# Patient Record
(demographics unavailable — no encounter records)

---

## 2025-01-09 NOTE — HISTORY OF PRESENT ILLNESS
[Never] : never [TextBox_4] : Ms. Bermeo is a 51 year-old female with a history of bronchiectasis, asthma, pseudomonas airway colonization, HLD, and DM2 (on Metformin and Ozempic) who presents for follow-up. She recently had worsening cough and sputum production with sinus congestion and green phlegm production in Dec 2024 s/p Z-pack and Medrol dose pack. Previously in October she also required Z-pack and Medrol dose pack. She is adherent with Trelegy Ellipta inhaler, Levalbuterol/3%NaCl nebs, Aerobika, montelukast, and intranasal steroid. She reports a history of asthma since her childhood. Reports chronic wheezing, chest tightness, chronic cough, and chronic nocturnal symptoms prior to starting Trelegy Ellipta inhaler. She last experienced hemoptysis due to bronchiectasis exacerbation in March 2024 requiring levofloxacin for 14 days. Prior exacerbation with hemoptysis more than 5 years ago. Sputum AFB negative in March 2024. She is prescribed airway clearance therapy with levalbuterol nebulizer every 8 hours + 3% sodium chloride nebulizer every 8 hours + Aerobika device after nebulizer therapy and throughout the day as tolerates. She reports using airway clearance therapy only twice daily and has difficulty tolerating levalbuterol due to development of tremors. She was previously started on tobramycin nebulizer 300 mg every 12 hours (28 days on, 28 days off); however, she developed joint pains and had to stop. Given associated asthma symptomatology (wheezing, chest tightness, cough, and nocturnal symptoms), she was also started on Trelegy Ellipta inhaler and montelukast with improvement. She also has some nasal congestion for which she takes fluticasone.   Sputum culture in March 2024 with Pseudomonas aeruginosa, intermediate to ciprofloxacin but sensitive to levofloxacin s/p levofloxacin 750 mg daily for 14 days. Repeat sputum culture in September 2024 with normal respiratory german. Sputum AFB in March/Sept 2024 negative. Sputum fungal culture in March 2024 negative.  LABS in March 2024 with borderline RF (15). CBC with microcytic anemia (HGB 11.3, MCV 74.7). No peripheral eosinophilia. IgE borderline elevated to 115. Workup otherwise unremarkable, including CMP (glucose elevated to 201), 25-OH Vit D (49.9), immunoglobulins, ANTHONY, CCP, CF expanded panel, aspergillus antibodies, A1AT, HIV, Quantiferon, and ACE. Iron/TIBC normal, %sat 12%, ferritin 41. A1C elevated to 8.1.  PFTs (March 2024) with normal spirometry, lung volumes, and diffusing capacity. No bronchodilator response.  CT Chest (March 2024) with patent central tracheobronchial tree. 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 Additional bronchiectasis in bilateral lower lobes (L>R). Mosaic attenuation. 4 mm nodular opacity in JORGE unchanged.

## 2025-01-09 NOTE — REVIEW OF SYSTEMS
[Nasal Congestion] : nasal congestion [Cough] : cough [Hemoptysis] : hemoptysis [Chest Tightness] : chest tightness [Sputum] : sputum [Wheezing] : wheezing [Negative] : Endocrine

## 2025-01-09 NOTE — ASSESSMENT
[FreeTextEntry1] : -  Ms. Bermeo is a 51 year-old female with a history of bronchiectasis, asthma, pseudomonas airway colonization, HLD, and DM2 (on Metformin and Ozempic) who presents for follow-up. She recently had worsening cough and sputum production with sinus congestion and green phlegm production in Dec 2024 s/p Z-pack and Medrol dose pack. Previously in October she also required Z-pack and Medrol dose pack. She is adherent with Trelegy Ellipta inhaler, Levalbuterol/3%NaCl nebs, Aerobika, montelukast, and intranasal steroid. She reports a history of asthma since her childhood. Reports chronic wheezing, chest tightness, chronic cough, and chronic nocturnal symptoms prior to starting Trelegy Ellipta inhaler. She last experienced hemoptysis due to bronchiectasis exacerbation in March 2024 requiring levofloxacin for 14 days. Prior exacerbation with hemoptysis more than 5 years ago. Sputum AFB negative in March 2024. She is prescribed airway clearance therapy with levalbuterol nebulizer every 8 hours + 3% sodium chloride nebulizer every 8 hours + Aerobika device after nebulizer therapy and throughout the day as tolerates. She reports using airway clearance therapy only twice daily and has difficulty tolerating levalbuterol due to development of tremors. She was previously started on tobramycin nebulizer 300 mg every 12 hours (28 days on, 28 days off); however, she developed joint pains and had to stop. Given associated asthma symptomatology (wheezing, chest tightness, cough, and nocturnal symptoms), she was also started on Trelegy Ellipta inhaler and montelukast with improvement. She also has some nasal congestion for which she takes fluticasone.  SPUTUM culture in March 2024 with Pseudomonas aeruginosa, intermediate to ciprofloxacin but sensitive to levofloxacin s/p levofloxacin 750 mg daily for 14 days. Repeat sputum culture in September 2024 with normal respiratory german. Sputum AFB in March/Sept 2024 negative. Sputum fungal culture in March 2024 negative.  LABS in March 2024 with borderline RF (15). CBC with microcytic anemia (HGB 11.3, MCV 74.7). No peripheral eosinophilia. IgE borderline elevated to 115. Workup otherwise unremarkable, including CMP (glucose elevated to 201), 25-OH Vit D (49.9), immunoglobulins, ANTHONY, CCP, CF expanded panel, aspergillus antibodies, A1AT, HIV, Quantiferon, and ACE. Iron/TIBC normal, %sat 12%, ferritin 41. A1C elevated to 8.1.  PFTs (March 2024) with normal spirometry, lung volumes, and diffusing capacity. No bronchodilator response.  CT Chest (March 2024) with patent central tracheobronchial tree. 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 Additional bronchiectasis in bilateral lower lobes (L>R). Mosaic attenuation. 4 mm nodular opacity in JORGE unchanged.    ASSESSMENT: Non-CF bronchiectasis Moderate persistent asthma History of Pseudomonas aeruginosa infection  PLAN: - Repeat sputum culture to evaluate for continued eradication of Pseudomonas aeruginosa - Check sputum AFB rule out non-tuberculous mycobacteria - Check sputum fungal culture - Continue airway clearance therapy with Levalbuterol nebulizer + 3% sodium 2-3x/day followed by Aerobika device - Can do 1.5 of the 3 mL of levalbuterol given reported tremors - Having difficulty tolerating Aerobika at setting of 3, can decrease to setting of 2 or 1 as tolerates - Recommend to use Aerobika device during the day as necessary for chest congestion - Hold off on tobramycin nebulizer therapy given previous myalgias and inability to tolerate - CT chest from March 2024 reviewed, not significantly changed compared to prior CT chest at [facility] in Sept 2020 - Continue Trelegy Ellipta inhaler 200 mcg 1 inhalation daily, rinse after use - Montelukast 10 mg at bedtime - Fluticasone nasal spray 2 sprays per nostril twice daily - Benzonatate 200 mg 2-3 times/day as necessary for cough or chest congestion - Up to date with influenza and Prevnar 20 vaccines (Sept 2024) - Follow-up in 3-4 months or sooner PRN

## 2025-01-09 NOTE — ASSESSMENT
[FreeTextEntry1] : -  Ms. Bermeo is a 51 year-old female with a history of bronchiectasis, asthma, pseudomonas airway colonization, HLD, and DM2 (on Metformin and Ozempic) who presents for follow-up. She recently had worsening cough and sputum production with sinus congestion and green phlegm production in Dec 2024 s/p Z-pack and Medrol dose pack. Previously in October she also required Z-pack and Medrol dose pack. She is adherent with Trelegy Ellipta inhaler, Levalbuterol/3%NaCl nebs, Aerobika, montelukast, and intranasal steroid. She reports a history of asthma since her childhood. Reports chronic wheezing, chest tightness, chronic cough, and chronic nocturnal symptoms prior to starting Trelegy Ellipta inhaler. She last experienced hemoptysis due to bronchiectasis exacerbation in March 2024 requiring levofloxacin for 14 days. Prior exacerbation with hemoptysis more than 5 years ago. Sputum AFB negative in March 2024. She is prescribed airway clearance therapy with levalbuterol nebulizer every 8 hours + 3% sodium chloride nebulizer every 8 hours + Aerobika device after nebulizer therapy and throughout the day as tolerates. She reports using airway clearance therapy only twice daily and has difficulty tolerating levalbuterol due to development of tremors. She was previously started on tobramycin nebulizer 300 mg every 12 hours (28 days on, 28 days off); however, she developed joint pains and had to stop. Given associated asthma symptomatology (wheezing, chest tightness, cough, and nocturnal symptoms), she was also started on Trelegy Ellipta inhaler and montelukast with improvement. She also has some nasal congestion for which she takes fluticasone.  SPUTUM culture in March 2024 with Pseudomonas aeruginosa, intermediate to ciprofloxacin but sensitive to levofloxacin s/p levofloxacin 750 mg daily for 14 days. Repeat sputum culture in September 2024 with normal respiratory german. Sputum AFB in March/Sept 2024 negative. Sputum fungal culture in March 2024 negative.  LABS in March 2024 with borderline RF (15). CBC with microcytic anemia (HGB 11.3, MCV 74.7). No peripheral eosinophilia. IgE borderline elevated to 115. Workup otherwise unremarkable, including CMP (glucose elevated to 201), 25-OH Vit D (49.9), immunoglobulins, ANTHONY, CCP, CF expanded panel, aspergillus antibodies, A1AT, HIV, Quantiferon, and ACE. Iron/TIBC normal, %sat 12%, ferritin 41. A1C elevated to 8.1.  PFTs (March 2024) with normal spirometry, lung volumes, and diffusing capacity. No bronchodilator response.  CT Chest (March 2024) with patent central tracheobronchial tree. 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 Additional bronchiectasis in bilateral lower lobes (L>R). Mosaic attenuation. 4 mm nodular opacity in JORGE unchanged.    ASSESSMENT: Non-CF bronchiectasis Moderate persistent asthma History of Pseudomonas aeruginosa infection  PLAN: - Repeat sputum culture to evaluate for continued eradication of Pseudomonas aeruginosa - Check sputum AFB rule out non-tuberculous mycobacteria - Check sputum fungal culture - Continue airway clearance therapy with Levalbuterol nebulizer + 3% sodium 2-3x/day followed by Aerobika device - Can do 1.5 of the 3 mL of levalbuterol given reported tremors - Having difficulty tolerating Aerobika at setting of 3, can decrease to setting of 2 or 1 as tolerates - Recommend to use Aerobika device during the day as necessary for chest congestion - Hold off on tobramycin nebulizer therapy given previous myalgias and inability to tolerate - CT chest from March 2024 reviewed, not significantly changed compared to prior CT chest at [facility] in Sept 2020 - Continue Trelegy Ellipta inhaler 200 mcg 1 inhalation daily, rinse after use - Montelukast 10 mg at bedtime - Fluticasone nasal spray 2 sprays per nostril twice daily - Benzonatate 200 mg 2-3 times/day as necessary for cough or chest congestion - Up to date with influenza and Prevnar 20 vaccines (Sept 2024) - Follow-up in 3-4 months or sooner PRN

## 2025-07-30 NOTE — ASSESSMENT
[FreeTextEntry1] : -  Ms. Bermeo is a 51 year-old female with a history of bronchiectasis, asthma, pseudomonas airway colonization, HLD, and DM2 (on Metformin and Ozempic) who presents for follow-up. States she has had a worsening cough with green sputum production for the last month. Denies any fevers or other evidence of systemic symptoms. Patient states she restarted nebulized Tobramycin BID 3 days ago, notes improvement in her symptoms since restarting. She recently had worsening cough and sputum production with sinus congestion and green phlegm production in March 2025 and was treated with Ciprofloxacin 14-day course also with exacerbation in Dec 2024 s/p Z-pack and Medrol dose pack and October 2024 she also required Z-pack and Medrol dose pack. She is adherent with Trelegy Ellipta inhaler, states she has been using albuterol rescue inhaler twice daily for symptom relief, Montelukast and fluticasone nasal spray daily. Complaint with Levalbuterol/3%NaCl nebs twice daily, has not been using Aerobika for the past few months. She reports a history of asthma since her childhood. Reports chronic wheezing, chest tightness, chronic cough, and chronic nocturnal symptoms prior to starting Trelegy Ellipta inhaler. She last experienced hemoptysis due to bronchiectasis exacerbation in March 2024 requiring levofloxacin for 14 days. Prior exacerbation with hemoptysis more than 5 years ago. Sputum AFB negative in January 2025. She is prescribed airway clearance therapy with levalbuterol nebulizer every 8 hours + 3% sodium chloride nebulizer every 8 hours + Aerobika device after nebulizer therapy and throughout the day as tolerates. She reports using airway clearance nebulizer therapy only twice daily and has not been compliant with Aerobika. She was previously started on tobramycin nebulizer 300 mg every 12 hours (28 days on, 28 days off); however, she developed joint pains and had to stop, recently restarted tobramycin nebulized 3 days ago. Given associated asthma symptomatology (wheezing, chest tightness, cough, and nocturnal symptoms), she was also started on Trelegy Ellipta inhaler and montelukast with improvement. She also has some nasal congestion for which she takes fluticasone.   CT CHEST (July 2025) with cystic bronchiectasis with complete collapse of the RML. Bilateral lower lobe cystic/cylindrical bronchiectasis with few foci of mucous plugging. No focal consolidation. NTM in the differential.  Sputum culture in March 2024 with Pseudomonas aeruginosa, intermediate to ciprofloxacin but sensitive to levofloxacin s/p levofloxacin 750 mg daily for 14 days. Repeat sputum culture in September 2024 with normal respiratory german. Sputum AFB in March/Sept 2024 negative. Sputum fungal culture in March 2024 negative.  LABS in March 2024 with borderline RF (15). CBC with microcytic anemia (HGB 11.3, MCV 74.7). No peripheral eosinophilia. IgE borderline elevated to 115. Workup otherwise unremarkable, including CMP (glucose elevated to 201), 25-OH Vit D (49.9), immunoglobulins, ANTHONY, CCP, CF expanded panel, aspergillus antibodies, A1AT, HIV, Quantiferon, and ACE. Iron/TIBC normal, %sat 12%, ferritin 41. A1C elevated to 8.1.  PFTs (March 2024) with normal spirometry, lung volumes, and diffusing capacity. No bronchodilator response.  CT Chest (March 2024) with patent central tracheobronchial tree. 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 Additional bronchiectasis in bilateral lower lobes (L>R). Mosaic attenuation. 4 mm nodular opacity in JORGE unchanged.  ASSESSMENT: Non-CF bronchiectasis Moderate persistent asthma History of Pseudomonas aeruginosa infection  PLAN: - Repeat sputum culture to evaluate for persistent colonization of Pseudomonas aeruginosa given her purulent sputum production - Check sputum AFB rule out non-tuberculous mycobacteria -Will upgrade to 7% Sodium chloride nebulized for airway clearance - Continue airway clearance therapy with Levalbuterol nebulizer + 7% sodium 2-3x/day, advised that she can use with Aerobika device to decrease the time spent doing ACT - Recommend using Aerobika device during the day as necessary for chest congestion - Will continue with Tobramycin (28 days on 28 days off) -Will hold off on additional antibiotics pending sputum culture results. Discussed potential of starting Azithromycin MWF if continued colonization - CT chest from July 2025 at [facility] reviewed, not significantly changed compared to prior CT chest in March 2024 - Continue Trelegy Ellipta inhaler 200 mcg 1 inhalation daily, rinse after use - Montelukast 10 mg at bedtime - Fluticasone nasal spray 2 sprays per nostril twice daily - Benzonatate 200 mg 2-3 times/day as necessary for cough or chest congestion - Up to date with influenza and Prevnar 20 vaccines (Sept 2024) -Will repeat PFTs and FeNO at follow-up visit - Follow-up in 3-4 months or sooner PRN

## 2025-07-30 NOTE — ASSESSMENT
[FreeTextEntry1] : -  Ms. Bermeo is a 51 year-old female with a history of bronchiectasis, asthma, pseudomonas airway colonization, HLD, and DM2 (on Metformin and Ozempic) who presents for follow-up. States she has had a worsening cough with green sputum production for the last month. Denies any fevers or other evidence of systemic symptoms. Patient states she restarted nebulized Tobramycin BID 3 days ago, notes improvement in her symptoms since restarting. She recently had worsening cough and sputum production with sinus congestion and green phlegm production in March 2025 and was treated with Ciprofloxacin 14-day course also with exacerbation in Dec 2024 s/p Z-pack and Medrol dose pack and October 2024 she also required Z-pack and Medrol dose pack. She is adherent with Trelegy Ellipta inhaler, states she has been using albuterol rescue inhaler twice daily for symptom relief, Montelukast and fluticasone nasal spray daily. Complaint with Levalbuterol/3%NaCl nebs twice daily, has not been using Aerobika for the past few months. She reports a history of asthma since her childhood. Reports chronic wheezing, chest tightness, chronic cough, and chronic nocturnal symptoms prior to starting Trelegy Ellipta inhaler. She last experienced hemoptysis due to bronchiectasis exacerbation in March 2024 requiring levofloxacin for 14 days. Prior exacerbation with hemoptysis more than 5 years ago. Sputum AFB negative in January 2025. She is prescribed airway clearance therapy with levalbuterol nebulizer every 8 hours + 3% sodium chloride nebulizer every 8 hours + Aerobika device after nebulizer therapy and throughout the day as tolerates. She reports using airway clearance nebulizer therapy only twice daily and has not been compliant with Aerobika. She was previously started on tobramycin nebulizer 300 mg every 12 hours (28 days on, 28 days off); however, she developed joint pains and had to stop, recently restarted tobramycin nebulized 3 days ago. Given associated asthma symptomatology (wheezing, chest tightness, cough, and nocturnal symptoms), she was also started on Trelegy Ellipta inhaler and montelukast with improvement. She also has some nasal congestion for which she takes fluticasone.   CT CHEST (July 2025) with cystic bronchiectasis with complete collapse of the RML. Bilateral lower lobe cystic/cylindrical bronchiectasis with few foci of mucous plugging. No focal consolidation. NTM in the differential.  Sputum culture in March 2024 with Pseudomonas aeruginosa, intermediate to ciprofloxacin but sensitive to levofloxacin s/p levofloxacin 750 mg daily for 14 days. Repeat sputum culture in September 2024 with normal respiratory german. Sputum AFB in March/Sept 2024 negative. Sputum fungal culture in March 2024 negative.  LABS in March 2024 with borderline RF (15). CBC with microcytic anemia (HGB 11.3, MCV 74.7). No peripheral eosinophilia. IgE borderline elevated to 115. Workup otherwise unremarkable, including CMP (glucose elevated to 201), 25-OH Vit D (49.9), immunoglobulins, ANTHONY, CCP, CF expanded panel, aspergillus antibodies, A1AT, HIV, Quantiferon, and ACE. Iron/TIBC normal, %sat 12%, ferritin 41. A1C elevated to 8.1.  PFTs (March 2024) with normal spirometry, lung volumes, and diffusing capacity. No bronchodilator response.  CT Chest (March 2024) with patent central tracheobronchial tree. 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 Additional bronchiectasis in bilateral lower lobes (L>R). Mosaic attenuation. 4 mm nodular opacity in JORGE unchanged.  ASSESSMENT: Non-CF bronchiectasis Moderate persistent asthma History of Pseudomonas aeruginosa infection  PLAN: - Repeat sputum culture to evaluate for persistent colonization of Pseudomonas aeruginosa given her purulent sputum production - Check sputum AFB rule out non-tuberculous mycobacteria -Will upgrade to 7% Sodium chloride nebulized for airway clearance - Continue airway clearance therapy with Levalbuterol nebulizer + 7% sodium 2-3x/day, advised that she can use with Aerobika device to decrease the time spent doing ACT - Recommend using Aerobika device during the day as necessary for chest congestion - Will continue with Tobramycin (28 days on 28 days off) -Will hold off on additional antibiotics pending sputum culture results. Discussed potential of starting Azithromycin MWF if continued colonization - CT chest from July 2025 at [facility] reviewed, not significantly changed compared to prior CT chest in March 2024 - Continue Trelegy Ellipta inhaler 200 mcg 1 inhalation daily, rinse after use - Montelukast 10 mg at bedtime - Fluticasone nasal spray 2 sprays per nostril twice daily - Benzonatate 200 mg 2-3 times/day as necessary for cough or chest congestion - Up to date with influenza and Prevnar 20 vaccines (Sept 2024) -Will repeat PFTs and FeNO at follow-up visit - Follow-up in 3-4 months or sooner PRN

## 2025-07-30 NOTE — HISTORY OF PRESENT ILLNESS
[Never] : never [TextBox_4] : Ms. Bermeo is a 51 year-old female with a history of bronchiectasis, asthma, pseudomonas airway colonization, HLD, and DM2 (on Metformin and Ozempic) who presents for follow-up. States she has had a worsening cough with green sputum production for the last month. Denies any fevers or other evidence of systemic symptoms. Patient states she restarted nebulized Tobramycin BID 3 days ago, notes improvement in her symptoms since restarting. She recently had worsening cough and sputum production with sinus congestion and green phlegm production in March 2025 and was treated with Ciprofloxacin 14-day course also with exacerbation in Dec 2024 s/p Z-pack and Medrol dose pack and October 2024 she also required Z-pack and Medrol dose pack. She is adherent with Trelegy Ellipta inhaler, states she has been using albuterol rescue inhaler twice daily for symptom relief, Montelukast and fluticasone nasal spray daily. Complaint with Levalbuterol/3%NaCl nebs twice daily, has not been using Aerobika for the past few months. She reports a history of asthma since her childhood. Reports chronic wheezing, chest tightness, chronic cough, and chronic nocturnal symptoms prior to starting Trelegy Ellipta inhaler. She last experienced hemoptysis due to bronchiectasis exacerbation in March 2024 requiring levofloxacin for 14 days. Prior exacerbation with hemoptysis more than 5 years ago. Sputum AFB negative in January 2025. She is prescribed airway clearance therapy with levalbuterol nebulizer every 8 hours + 3% sodium chloride nebulizer every 8 hours + Aerobika device after nebulizer therapy and throughout the day as tolerates. She reports using airway clearance nebulizer therapy only twice daily and has not been compliant with Aerobika. She was previously started on tobramycin nebulizer 300 mg every 12 hours (28 days on, 28 days off); however, she developed joint pains and had to stop, recently restarted tobramycin nebulized 3 days ago. Given associated asthma symptomatology (wheezing, chest tightness, cough, and nocturnal symptoms), she was also started on Trelegy Ellipta inhaler and montelukast with improvement. She also has some nasal congestion for which she takes fluticasone.   CT CHEST (July 2025) with cystic bronchiectasis with complete collapse of the RML. Bilateral lower lobe cystic/cylindrical bronchiectasis with few foci of mucous plugging. No focal consolidation. NTM in the differential.  Sputum culture in March 2024 with Pseudomonas aeruginosa, intermediate to ciprofloxacin but sensitive to levofloxacin s/p levofloxacin 750 mg daily for 14 days. Repeat sputum culture in September 2024 with normal respiratory german. Sputum AFB in March/Sept 2024 negative. Sputum fungal culture in March 2024 negative.  LABS in March 2024 with borderline RF (15). CBC with microcytic anemia (HGB 11.3, MCV 74.7). No peripheral eosinophilia. IgE borderline elevated to 115. Workup otherwise unremarkable, including CMP (glucose elevated to 201), 25-OH Vit D (49.9), immunoglobulins, ANTHONY, CCP, CF expanded panel, aspergillus antibodies, A1AT, HIV, Quantiferon, and ACE. Iron/TIBC normal, %sat 12%, ferritin 41. A1C elevated to 8.1.  PFTs (March 2024) with normal spirometry, lung volumes, and diffusing capacity. No bronchodilator response.  CT Chest (March 2024) with patent central tracheobronchial tree. 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 Additional bronchiectasis in bilateral lower lobes (L>R). Mosaic attenuation. 4 mm nodular opacity in JORGE unchanged.

## 2025-07-30 NOTE — REVIEW OF SYSTEMS
[Nasal Congestion] : nasal congestion [Cough] : cough [Chest Tightness] : chest tightness [Sputum] : sputum [Wheezing] : wheezing [Negative] : Endocrine [Hemoptysis] : no hemoptysis